# Patient Record
Sex: FEMALE | Employment: STUDENT
[De-identification: names, ages, dates, MRNs, and addresses within clinical notes are randomized per-mention and may not be internally consistent; named-entity substitution may affect disease eponyms.]

---
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
## 2023-03-14 ENCOUNTER — OFFICE VISIT (OUTPATIENT)
Dept: OBGYN CLINIC | Facility: CLINIC | Age: 18
End: 2023-03-14

## 2023-03-14 VITALS — DIASTOLIC BLOOD PRESSURE: 77 MMHG | WEIGHT: 156.81 LBS | HEART RATE: 109 BPM | SYSTOLIC BLOOD PRESSURE: 126 MMHG

## 2023-03-14 DIAGNOSIS — N83.202 CYST OF LEFT OVARY: Primary | ICD-10-CM

## 2023-03-14 PROCEDURE — 99202 OFFICE O/P NEW SF 15 MIN: CPT | Performed by: OBSTETRICS & GYNECOLOGY

## 2023-03-14 PROCEDURE — 3074F SYST BP LT 130 MM HG: CPT | Performed by: OBSTETRICS & GYNECOLOGY

## 2023-03-14 PROCEDURE — 3078F DIAST BP <80 MM HG: CPT | Performed by: OBSTETRICS & GYNECOLOGY

## 2023-03-14 NOTE — PROGRESS NOTES
Mervat Anguiano is a 19year old female  Patient's last menstrual period was 2023. Patient presents with:  Gyn Problem: NEW PATIENT, DISCUSS OVARIAN CYST ON LEFT, PAINFUL   Presenting to discuss left ovarian cyst. Reports daily pain that started 3 months. Pain is sharp which is noted to be mostly right sided. Had an ultrasound completed at [facility] on 2/10/23 hemorrhagic cyst measuring 1.2 x 1.1.     OBSTETRICS HISTORY:  OB History     T0    L0    SAB0  IAB0  Ectopic0  Multiple0  Live Births0     GYNE HISTORY:  Patient's last menstrual period was 2023. Sexual activity: Not on file               MEDICAL HISTORY:  History reviewed. No pertinent past medical history. SURGICAL HISTORY:  History reviewed. No pertinent surgical history. SOCIAL HISTORY:  Social History    Socioeconomic History      Marital status: Single      Spouse name: Not on file      Number of children: Not on file      Years of education: Not on file      Highest education level: Not on file    Occupational History      Not on file    Tobacco Use      Smoking status: Never      Smokeless tobacco: Never    Vaping Use      Vaping status: Not on file    Substance and Sexual Activity      Alcohol use: Not on file      Drug use: Not on file      Sexual activity: Not on file    Other Topics      Concerns:        Not on file    Social History Narrative      Not on file    Social Determinants of Health  Financial Resource Strain: Not on file  Food Insecurity: Not on file  Transportation Needs: Not on file  Physical Activity: Not on file  Stress: Not on file  Social Connections: Not on file  Housing Stability: Not on file      MEDICATIONS:  No current outpatient medications on file. ALLERGIES:  Not on File      Review of Systems:  Review of Systems   All other systems reviewed and are negative.        23  0851   BP: 126/77   Pulse: 109       PHYSICAL EXAM:   Physical Exam  Vitals reviewed.    Constitutional: Appearance: Normal appearance. HENT:      Head: Atraumatic. Eyes:      Pupils: Pupils are equal, round, and reactive to light. Pulmonary:      Effort: Pulmonary effort is normal.   Abdominal:      General: Abdomen is flat. Palpations: Abdomen is soft. Tenderness: There is no abdominal tenderness. Genitourinary:     General: Normal vulva. Exam position: Lithotomy position. Labia:         Right: No rash, tenderness, lesion or injury. Left: No rash, tenderness, lesion or injury. Vagina: Normal.      Uterus: Normal. Not tender. Adnexa: Left adnexa normal.        Right: Tenderness present. No mass or fullness. Left: No mass, tenderness or fullness. Skin:     General: Skin is warm and dry. Neurological:      General: No focal deficit present. Mental Status: She is alert and oriented to person, place, and time. Psychiatric:         Mood and Affect: Mood normal.         Behavior: Behavior normal.         Thought Content: Thought content normal.         Judgment: Judgment normal.           Assessment & Plan:  Lakshmi was seen today for gyn problem. Diagnoses and all orders for this visit:    Cyst of left ovary  -     US PELVIS W EV (CPT=76856/29293); Future        Requested Prescriptions      No prescriptions requested or ordered in this encounter       Discussed repeat ultrasound 8 weeks from original. Ibuprofen for pain.